# Patient Record
Sex: MALE | Race: WHITE | Employment: FULL TIME | URBAN - METROPOLITAN AREA
[De-identification: names, ages, dates, MRNs, and addresses within clinical notes are randomized per-mention and may not be internally consistent; named-entity substitution may affect disease eponyms.]

---

## 2022-10-07 ENCOUNTER — OFFICE VISIT (OUTPATIENT)
Dept: ORTHOPEDIC SURGERY | Age: 47
End: 2022-10-07
Payer: COMMERCIAL

## 2022-10-07 DIAGNOSIS — M25.561 BILATERAL CHRONIC KNEE PAIN: Primary | ICD-10-CM

## 2022-10-07 DIAGNOSIS — M25.562 BILATERAL CHRONIC KNEE PAIN: Primary | ICD-10-CM

## 2022-10-07 DIAGNOSIS — G89.29 BILATERAL CHRONIC KNEE PAIN: Primary | ICD-10-CM

## 2022-10-07 NOTE — PROGRESS NOTES
Iveth Lu (: 1975) is a 50 y.o. male, patient, here for evaluation of the following chief complaint(s):  Knee Pain (right)       HPI:    The patient started having right knee pain after working on . He has associated swelling. The pain and swelling got better after 2 3 days and he is taking Duexis to help. The patient reports a soccer injury 10 to 20 years ago with 2 previous ACL surgeries. The describes pain is mild dull comes and goes. Is associated swelling. Exercise makes it worse ice makes it better along with Duexis. He was last seen in the emergency room for this issue. The patient's previous surgeries were performed by Dr. Tahira Schroeder and Tata Lopez. No Known Allergies    Current Outpatient Medications   Medication Sig    pravastatin (PRAVACHOL) 20 mg tablet Take 10 mg by mouth daily. No current facility-administered medications for this visit. History reviewed. No pertinent past medical history. History reviewed. No pertinent surgical history. History reviewed. No pertinent family history.      Social History     Socioeconomic History    Marital status:      Spouse name: Not on file    Number of children: Not on file    Years of education: Not on file    Highest education level: Not on file   Occupational History    Not on file   Tobacco Use    Smoking status: Unknown    Smokeless tobacco: Not on file   Substance and Sexual Activity    Alcohol use: Not on file    Drug use: Not on file    Sexual activity: Not on file   Other Topics Concern    Not on file   Social History Narrative    Not on file     Social Determinants of Health     Financial Resource Strain: Not on file   Food Insecurity: Not on file   Transportation Needs: Not on file   Physical Activity: Not on file   Stress: Not on file   Social Connections: Not on file   Intimate Partner Violence: Not on file   Housing Stability: Not on file       Review of Systems   All other systems reviewed and are negative. Vitals:  Ht 5' 8\" (1.727 m)   Wt 230 lb (104.3 kg)   BMI 34.97 kg/m²    Body mass index is 34.97 kg/m². Ortho Exam     The patient is well-developed and well-nourished. The patient presents today in alert and oriented x3 with a normal mood and affect. The patient stands with a normal weightbearing line walks with a slightly antalgic gait because of his bilateral knee pain. Right knee    Left knee     ASSESSMENT/PLAN:      1. Bilateral chronic knee pain     Below is the assessment and plan developed based on review of pertinent history, physical exam, labs, studies, and medications. No follow-ups on file. An electronic signature was used to authenticate this note.   -- Adeola Le MD

## 2022-10-10 VITALS — BODY MASS INDEX: 34.86 KG/M2 | HEIGHT: 68 IN | WEIGHT: 230 LBS

## 2022-10-10 RX ORDER — PRAVASTATIN SODIUM 20 MG/1
10 TABLET ORAL DAILY
COMMUNITY
Start: 2022-09-13

## 2023-05-24 RX ORDER — PRAVASTATIN SODIUM 20 MG
10 TABLET ORAL DAILY
COMMUNITY
Start: 2022-09-13

## 2024-10-18 ENCOUNTER — HOSPITAL ENCOUNTER (OUTPATIENT)
Facility: HOSPITAL | Age: 49
Discharge: HOME | End: 2024-10-21

## 2024-10-18 RX ORDER — PREDNISONE 20 MG/1
20 TABLET ORAL DAILY
COMMUNITY

## 2024-10-18 RX ORDER — METOPROLOL SUCCINATE 25 MG/1
25 TABLET, EXTENDED RELEASE ORAL DAILY
COMMUNITY

## 2024-10-18 NOTE — DISCHARGE INSTRUCTIONS
[facility]                   [address] [place] [address]   MAIN OR                                  [phone]   MAIN PRE OP           [phone]                                                                                AMBULATORY PRE OP          [phone]  PRE-ADMISSION TESTING    [phone]   Surgery Date:  10/23/2024           Is surgery arrival time given by surgeon?  NO  If “NO”, [facility] staff will call you between 3 and 7pm the day before your surgery with your arrival time. (If your surgery is on a Monday, we will call you the Friday before.)    Call [phone] after 7pm Monday-Friday if you did not receive this call.         INSTRUCTIONS BEFORE YOUR SURGERY   When You  Arrive Arrive at the 2nd Floor Admitting Desk on the day of your surgery.  Have your insurance card, photo ID,living will/advanced directive/POA (if applicable),  and any copayment (if needed)   Food   and   Drink NO food or drink after midnight the night before surgery    This means NO gum, mints, coffee, juice, food, etc.      You may drink WATER ONLY up until two (2) hours before your surgery. DO NOT ADD ANYTHING TO THIS WATER to avoid postponement of your surgery.     No alcohol (beer, wine, liquor) or marijuana 24 hours before and after surgery   Medications to   TAKE   Morning of Surgery MEDICATIONS TO TAKE THE MORNING OF SURGERY WITH A SIP OF WATER:   Metoprolol  Pravastatin      Medications  To  STOP      7 days before surgery Non-Steroidal anti-inflammatory Drugs (NSAID's): for example, Ibuprofen (Advil, Motrin), Naproxen (Aleve)  Aspirin, if taking for pain   Herbal supplements, vitamins, and fish oil     (Pain medications not listed above, including Tylenol may be taken)   Blood  Thinners If you take  Aspirin, Plavix, Coumadin, or any blood-thinning or anti-blood clot medicine, talk to the doctor who prescribed the medications for pre-operative instructions.  You will

## 2024-10-18 NOTE — PERIOP NOTE
Called surgeon office, spoke to Maggie about posting and orders not matching.  Per Maggie, orders are correct, hospital posting is not.  Maggie states she will call scheduling/Kerr to have posting corrected.  
from Pre Admission Testing with instructions for your blood thinner from your physician.    Bathing Clothing  Jewelry  Valuables     If you shower the morning of surgery, please do not apply anything to your skin (lotions, powders, deodorant, or makeup, especially mascara)  Do not shave or trim anywhere 24 hours before surgery  Wear your hair loose or down; no pony-tails, buns, or metal hair clips  Wear loose, comfortable, clean clothes  Wear glasses instead of contacts. Bring a case to keep your glasses safe.  Leave money, valuables, and jewelry, including body piercings, at home.     Going Home - or Spending the Night SAME-DAY SURGERY: You must have a responsible adult drive you home and stay with you 24 hours after surgery. You may not drive for 24 hours after surgery.     Special Instructions Free  parking available from 7 AM until 5 PM.       Follow all instructions so your surgery won’t be cancelled.  Please, be on time.                    If a situation occurs and you are delayed the day of surgery, call [phone].    If your physical condition changes (like a fever, cold, flu, etc.) call your surgeon.    Home medication(s) reviewed and verified via LIST and VERBALLY during PAT appointment.    The patient was contacted IN-PERSON.  The patient verbalizes understanding of all instructions and DOES NOT need reinforcement.

## 2024-10-23 ENCOUNTER — HOSPITAL ENCOUNTER (OUTPATIENT)
Facility: HOSPITAL | Age: 49
Setting detail: OUTPATIENT SURGERY
Discharge: HOME | End: 2024-10-23
Attending: OTOLARYNGOLOGY | Admitting: OTOLARYNGOLOGY
Payer: COMMERCIAL

## 2024-10-23 ENCOUNTER — ANESTHESIA (OUTPATIENT)
Facility: HOSPITAL | Age: 49
End: 2024-10-23
Payer: COMMERCIAL

## 2024-10-23 ENCOUNTER — ANESTHESIA EVENT (OUTPATIENT)
Facility: HOSPITAL | Age: 49
End: 2024-10-23
Payer: COMMERCIAL

## 2024-10-23 VITALS
RESPIRATION RATE: 14 BRPM | HEART RATE: 74 BPM | DIASTOLIC BLOOD PRESSURE: 94 MMHG | OXYGEN SATURATION: 91 % | BODY MASS INDEX: 35.16 KG/M2 | TEMPERATURE: 97.8 F | WEIGHT: 232 LBS | SYSTOLIC BLOOD PRESSURE: 139 MMHG | HEIGHT: 68 IN

## 2024-10-23 PROCEDURE — 2500000003 HC RX 250 WO HCPCS

## 2024-10-23 PROCEDURE — 7100000011 HC PHASE II RECOVERY - ADDTL 15 MIN: Performed by: OTOLARYNGOLOGY

## 2024-10-23 PROCEDURE — 2709999900 HC NON-CHARGEABLE SUPPLY: Performed by: OTOLARYNGOLOGY

## 2024-10-23 PROCEDURE — 6360000002 HC RX W HCPCS: Performed by: ANESTHESIOLOGY

## 2024-10-23 PROCEDURE — 87075 CULTR BACTERIA EXCEPT BLOOD: CPT

## 2024-10-23 PROCEDURE — 7100000001 HC PACU RECOVERY - ADDTL 15 MIN: Performed by: OTOLARYNGOLOGY

## 2024-10-23 PROCEDURE — 2720000010 HC SURG SUPPLY STERILE: Performed by: OTOLARYNGOLOGY

## 2024-10-23 PROCEDURE — 2580000003 HC RX 258

## 2024-10-23 PROCEDURE — 87076 CULTURE ANAEROBE IDENT EACH: CPT

## 2024-10-23 PROCEDURE — 6360000002 HC RX W HCPCS: Performed by: OTOLARYNGOLOGY

## 2024-10-23 PROCEDURE — 3600000013 HC SURGERY LEVEL 3 ADDTL 15MIN: Performed by: OTOLARYNGOLOGY

## 2024-10-23 PROCEDURE — 6370000000 HC RX 637 (ALT 250 FOR IP): Performed by: OTOLARYNGOLOGY

## 2024-10-23 PROCEDURE — 6370000000 HC RX 637 (ALT 250 FOR IP): Performed by: ANESTHESIOLOGY

## 2024-10-23 PROCEDURE — 2580000003 HC RX 258: Performed by: OTOLARYNGOLOGY

## 2024-10-23 PROCEDURE — 7100000000 HC PACU RECOVERY - FIRST 15 MIN: Performed by: OTOLARYNGOLOGY

## 2024-10-23 PROCEDURE — 87147 CULTURE TYPE IMMUNOLOGIC: CPT

## 2024-10-23 PROCEDURE — 87077 CULTURE AEROBIC IDENTIFY: CPT

## 2024-10-23 PROCEDURE — 6360000002 HC RX W HCPCS

## 2024-10-23 PROCEDURE — 3700000000 HC ANESTHESIA ATTENDED CARE: Performed by: OTOLARYNGOLOGY

## 2024-10-23 PROCEDURE — 3600000003 HC SURGERY LEVEL 3 BASE: Performed by: OTOLARYNGOLOGY

## 2024-10-23 PROCEDURE — 7100000010 HC PHASE II RECOVERY - FIRST 15 MIN: Performed by: OTOLARYNGOLOGY

## 2024-10-23 PROCEDURE — 3700000001 HC ADD 15 MINUTES (ANESTHESIA): Performed by: OTOLARYNGOLOGY

## 2024-10-23 PROCEDURE — 87102 FUNGUS ISOLATION CULTURE: CPT

## 2024-10-23 PROCEDURE — 87185 SC STD ENZYME DETCJ PER NZM: CPT

## 2024-10-23 PROCEDURE — A4217 STERILE WATER/SALINE, 500 ML: HCPCS | Performed by: OTOLARYNGOLOGY

## 2024-10-23 RX ORDER — PROPOFOL 10 MG/ML
INJECTION, EMULSION INTRAVENOUS
Status: DISCONTINUED | OUTPATIENT
Start: 2024-10-23 | End: 2024-10-23 | Stop reason: SDUPTHER

## 2024-10-23 RX ORDER — ALBUTEROL SULFATE 0.83 MG/ML
2.5 SOLUTION RESPIRATORY (INHALATION)
Status: DISCONTINUED | OUTPATIENT
Start: 2024-10-23 | End: 2024-10-23 | Stop reason: HOSPADM

## 2024-10-23 RX ORDER — SODIUM CHLORIDE 9 MG/ML
INJECTION, SOLUTION INTRAVENOUS CONTINUOUS
Status: DISCONTINUED | OUTPATIENT
Start: 2024-10-23 | End: 2024-10-23 | Stop reason: HOSPADM

## 2024-10-23 RX ORDER — ROCURONIUM BROMIDE 10 MG/ML
INJECTION, SOLUTION INTRAVENOUS
Status: DISCONTINUED | OUTPATIENT
Start: 2024-10-23 | End: 2024-10-23 | Stop reason: SDUPTHER

## 2024-10-23 RX ORDER — VANCOMYCIN HYDROCHLORIDE 1 G/20ML
INJECTION, POWDER, LYOPHILIZED, FOR SOLUTION INTRAVENOUS PRN
Status: DISCONTINUED | OUTPATIENT
Start: 2024-10-23 | End: 2024-10-23 | Stop reason: ALTCHOICE

## 2024-10-23 RX ORDER — NALOXONE HYDROCHLORIDE 0.4 MG/ML
INJECTION, SOLUTION INTRAMUSCULAR; INTRAVENOUS; SUBCUTANEOUS PRN
Status: DISCONTINUED | OUTPATIENT
Start: 2024-10-23 | End: 2024-10-23 | Stop reason: HOSPADM

## 2024-10-23 RX ORDER — ACETAMINOPHEN 325 MG/1
650 TABLET ORAL ONCE
Status: COMPLETED | OUTPATIENT
Start: 2024-10-23 | End: 2024-10-23

## 2024-10-23 RX ORDER — OXYMETAZOLINE HYDROCHLORIDE 0.05 G/100ML
2 SPRAY NASAL 2 TIMES DAILY PRN
Status: DISCONTINUED | OUTPATIENT
Start: 2024-10-23 | End: 2024-10-23 | Stop reason: HOSPADM

## 2024-10-23 RX ORDER — DROPERIDOL 2.5 MG/ML
0.62 INJECTION, SOLUTION INTRAMUSCULAR; INTRAVENOUS
Status: DISCONTINUED | OUTPATIENT
Start: 2024-10-23 | End: 2024-10-23 | Stop reason: HOSPADM

## 2024-10-23 RX ORDER — FAMOTIDINE 10 MG/ML
INJECTION, SOLUTION INTRAVENOUS
Status: DISCONTINUED | OUTPATIENT
Start: 2024-10-23 | End: 2024-10-23 | Stop reason: SDUPTHER

## 2024-10-23 RX ORDER — FENTANYL CITRATE 50 UG/ML
INJECTION, SOLUTION INTRAMUSCULAR; INTRAVENOUS
Status: DISCONTINUED | OUTPATIENT
Start: 2024-10-23 | End: 2024-10-23 | Stop reason: SDUPTHER

## 2024-10-23 RX ORDER — ONDANSETRON 2 MG/ML
INJECTION INTRAMUSCULAR; INTRAVENOUS
Status: DISCONTINUED | OUTPATIENT
Start: 2024-10-23 | End: 2024-10-23 | Stop reason: SDUPTHER

## 2024-10-23 RX ORDER — OXYCODONE HYDROCHLORIDE 5 MG/1
5 TABLET ORAL
Status: DISCONTINUED | OUTPATIENT
Start: 2024-10-23 | End: 2024-10-23 | Stop reason: HOSPADM

## 2024-10-23 RX ORDER — SODIUM CHLORIDE 9 MG/ML
INJECTION, SOLUTION INTRAVENOUS
Status: DISCONTINUED | OUTPATIENT
Start: 2024-10-23 | End: 2024-10-23 | Stop reason: SDUPTHER

## 2024-10-23 RX ORDER — MEPERIDINE HYDROCHLORIDE 25 MG/ML
12.5 INJECTION INTRAMUSCULAR; INTRAVENOUS; SUBCUTANEOUS EVERY 5 MIN PRN
Status: DISCONTINUED | OUTPATIENT
Start: 2024-10-23 | End: 2024-10-23 | Stop reason: HOSPADM

## 2024-10-23 RX ORDER — DIPHENHYDRAMINE HYDROCHLORIDE 50 MG/ML
12.5 INJECTION INTRAMUSCULAR; INTRAVENOUS
Status: DISCONTINUED | OUTPATIENT
Start: 2024-10-23 | End: 2024-10-23 | Stop reason: HOSPADM

## 2024-10-23 RX ORDER — IPRATROPIUM BROMIDE AND ALBUTEROL SULFATE 2.5; .5 MG/3ML; MG/3ML
1 SOLUTION RESPIRATORY (INHALATION)
Status: DISCONTINUED | OUTPATIENT
Start: 2024-10-23 | End: 2024-10-23 | Stop reason: HOSPADM

## 2024-10-23 RX ORDER — DEXMEDETOMIDINE HYDROCHLORIDE 100 UG/ML
INJECTION, SOLUTION INTRAVENOUS
Status: DISCONTINUED | OUTPATIENT
Start: 2024-10-23 | End: 2024-10-23 | Stop reason: SDUPTHER

## 2024-10-23 RX ORDER — MIDAZOLAM HYDROCHLORIDE 1 MG/ML
INJECTION, SOLUTION INTRAMUSCULAR; INTRAVENOUS
Status: DISCONTINUED | OUTPATIENT
Start: 2024-10-23 | End: 2024-10-23 | Stop reason: SDUPTHER

## 2024-10-23 RX ORDER — EPINEPHRINE 1 MG/ML(1)
AMPUL (ML) INJECTION PRN
Status: DISCONTINUED | OUTPATIENT
Start: 2024-10-23 | End: 2024-10-23 | Stop reason: ALTCHOICE

## 2024-10-23 RX ORDER — LIDOCAINE HYDROCHLORIDE 10 MG/ML
1 INJECTION, SOLUTION EPIDURAL; INFILTRATION; INTRACAUDAL; PERINEURAL
Status: DISCONTINUED | OUTPATIENT
Start: 2024-10-23 | End: 2024-10-23 | Stop reason: HOSPADM

## 2024-10-23 RX ORDER — LABETALOL HYDROCHLORIDE 5 MG/ML
10 INJECTION, SOLUTION INTRAVENOUS
Status: DISCONTINUED | OUTPATIENT
Start: 2024-10-23 | End: 2024-10-23 | Stop reason: HOSPADM

## 2024-10-23 RX ORDER — DEXAMETHASONE SODIUM PHOSPHATE 4 MG/ML
INJECTION, SOLUTION INTRA-ARTICULAR; INTRALESIONAL; INTRAMUSCULAR; INTRAVENOUS; SOFT TISSUE
Status: DISCONTINUED | OUTPATIENT
Start: 2024-10-23 | End: 2024-10-23 | Stop reason: SDUPTHER

## 2024-10-23 RX ORDER — HYDROMORPHONE HYDROCHLORIDE 1 MG/ML
1 INJECTION, SOLUTION INTRAMUSCULAR; INTRAVENOUS; SUBCUTANEOUS EVERY 5 MIN PRN
Status: DISCONTINUED | OUTPATIENT
Start: 2024-10-23 | End: 2024-10-23 | Stop reason: HOSPADM

## 2024-10-23 RX ORDER — DEXAMETHASONE SODIUM PHOSPHATE 10 MG/ML
10 INJECTION, SOLUTION INTRAMUSCULAR; INTRAVENOUS
Status: DISCONTINUED | OUTPATIENT
Start: 2024-10-23 | End: 2024-10-23 | Stop reason: HOSPADM

## 2024-10-23 RX ADMIN — FAMOTIDINE 20 MG: 10 INJECTION, SOLUTION INTRAVENOUS at 15:14

## 2024-10-23 RX ADMIN — DEXAMETHASONE SODIUM PHOSPHATE 10 MG: 4 INJECTION, SOLUTION INTRAMUSCULAR; INTRAVENOUS at 15:10

## 2024-10-23 RX ADMIN — PROPOFOL 150 MCG/KG/MIN: 10 INJECTION, EMULSION INTRAVENOUS at 15:02

## 2024-10-23 RX ADMIN — PROPOFOL 50 MG: 10 INJECTION, EMULSION INTRAVENOUS at 15:37

## 2024-10-23 RX ADMIN — FENTANYL CITRATE 50 MCG: 50 INJECTION, SOLUTION INTRAMUSCULAR; INTRAVENOUS at 15:11

## 2024-10-23 RX ADMIN — ONDANSETRON 4 MG: 2 INJECTION INTRAMUSCULAR; INTRAVENOUS at 15:14

## 2024-10-23 RX ADMIN — OXYMETAZOLINE HYDROCHLORIDE 2 SPRAY: 0.5 SPRAY NASAL at 14:32

## 2024-10-23 RX ADMIN — FENTANYL CITRATE 50 MCG: 50 INJECTION, SOLUTION INTRAMUSCULAR; INTRAVENOUS at 14:56

## 2024-10-23 RX ADMIN — FENTANYL CITRATE 50 MCG: 50 INJECTION, SOLUTION INTRAMUSCULAR; INTRAVENOUS at 16:23

## 2024-10-23 RX ADMIN — SODIUM CHLORIDE: 9 INJECTION, SOLUTION INTRAVENOUS at 14:55

## 2024-10-23 RX ADMIN — WATER 2000 MG: 1 INJECTION INTRAMUSCULAR; INTRAVENOUS; SUBCUTANEOUS at 15:11

## 2024-10-23 RX ADMIN — OXYMETAZOLINE HYDROCHLORIDE 2 SPRAY: 0.5 SPRAY NASAL at 14:55

## 2024-10-23 RX ADMIN — PROPOFOL 200 MG: 10 INJECTION, EMULSION INTRAVENOUS at 15:00

## 2024-10-23 RX ADMIN — ROCURONIUM BROMIDE 20 MG: 10 INJECTION, SOLUTION INTRAVENOUS at 15:37

## 2024-10-23 RX ADMIN — FENTANYL CITRATE 50 MCG: 50 INJECTION, SOLUTION INTRAMUSCULAR; INTRAVENOUS at 15:00

## 2024-10-23 RX ADMIN — MIDAZOLAM HYDROCHLORIDE 2 MG: 1 INJECTION, SOLUTION INTRAMUSCULAR; INTRAVENOUS at 14:55

## 2024-10-23 RX ADMIN — DEXMEDETOMIDINE 12 MCG: 100 INJECTION, SOLUTION INTRAVENOUS at 15:34

## 2024-10-23 RX ADMIN — LIDOCAINE HYDROCHLORIDE 100 MG: 20 INJECTION, SOLUTION EPIDURAL; INFILTRATION; INTRACAUDAL; PERINEURAL at 15:00

## 2024-10-23 RX ADMIN — ACETAMINOPHEN 650 MG: 325 TABLET ORAL at 13:46

## 2024-10-23 RX ADMIN — ROCURONIUM BROMIDE 50 MG: 10 INJECTION, SOLUTION INTRAVENOUS at 15:00

## 2024-10-23 RX ADMIN — HYDROMORPHONE HYDROCHLORIDE 0.5 MG: 1 INJECTION, SOLUTION INTRAMUSCULAR; INTRAVENOUS; SUBCUTANEOUS at 16:55

## 2024-10-23 ASSESSMENT — PAIN DESCRIPTION - LOCATION: LOCATION: HEAD

## 2024-10-23 ASSESSMENT — PAIN - FUNCTIONAL ASSESSMENT
PAIN_FUNCTIONAL_ASSESSMENT: ACTIVITIES ARE NOT PREVENTED
PAIN_FUNCTIONAL_ASSESSMENT: 0-10

## 2024-10-23 ASSESSMENT — PAIN SCALES - GENERAL
PAINLEVEL_OUTOF10: 7
PAINLEVEL_OUTOF10: 2

## 2024-10-23 ASSESSMENT — PAIN DESCRIPTION - DESCRIPTORS: DESCRIPTORS: ACHING

## 2024-10-23 NOTE — OP NOTE
[facility]          [address] [place]  [address]                            OPERATIVE REPORT      PATIENT NAME: STEFFI ARENAS       : 1975  MED REC NO: [id number]                       ROOM: OR  ACCOUNT NO: [id number]                       ADMIT DATE: 10/23/2024  PROVIDER: Umang Christina MD    DATE OF SERVICE:  10/23/2024    PREOPERATIVE DIAGNOSES:  Right maxillary sinusitis.    POSTOPERATIVE DIAGNOSES:  Right maxillary sinusitis.    PROCEDURES PERFORMED:  Endoscopic right maxillary antrostomy with removal of tissue.    SURGEON:  Umang Christina MD    ASSISTANT:  ***    ANESTHESIA:  General.    ESTIMATED BLOOD LOSS:  Minimal.    SPECIMENS REMOVED:  None.    INTRAOPERATIVE FINDINGS:  There was edema in the middle meatus around the uncinate process region after entry into the sinus.  The sinus mucosa was very edematous and inflamed.  There was some purulent material in the sinus.     COMPLICATIONS:  None.    IMPLANTS:  ***    INDICATIONS:  The patient is a 49-year-old male who underwent a dental procedure and then developed some maxillary sinusitis on the right side that was persistent despite antibiotics.  CT scan showed opacification of the maxillary sinus on the right side.    DESCRIPTION OF PROCEDURE:  After informed consent, the patient was taken to the operating room and placed on the table in supine position.  After general anesthesia, the table was turned 180 degrees.  The nose was packed with epinephrine sponges and then the patient was prepped and draped in standard fashion.  Initially, the sponges were removed.  An 0 degree 4 mm endoscope was used to visualize the inside of the nose.  The middle turbinate was gently medialized.  The uncinate process was identified and was in-fractured with the ball-tip probe.  The uncinate was then removed using a 4 mm suction debrider device.  The natural os of the maxillary sinus was quite

## 2024-10-23 NOTE — ANESTHESIA PRE PROCEDURE
Past Surgical History:        Procedure Laterality Date    ANTERIOR CRUCIATE LIGAMENT REPAIR Bilateral     COLONOSCOPY      MOUTH SURGERY  09/2024    TONSILLECTOMY      as child       Social History:    Social History     Tobacco Use    Smoking status: Never    Smokeless tobacco: Never   Substance Use Topics    Alcohol use: Yes     Alcohol/week: 1.0 standard drink of alcohol     Types: 1 Cans of beer per week                                Counseling given: Not Answered      Vital Signs (Current):   Vitals:    10/23/24 1348   BP: (!) 140/90   Pulse: 88   Resp: 16   Temp: 97.9 °F (36.6 °C)   TempSrc: Oral   SpO2: 99%                                              BP Readings from Last 3 Encounters:   10/23/24 (!) 140/90       NPO Status: Time of last liquid consumption: 1330                        Time of last solid consumption: 1330                        Date of last liquid consumption: 10/22/24                        Date of last solid food consumption: 10/22/24    BMI:   Wt Readings from Last 3 Encounters:   10/10/22 104.3 kg (230 lb)     There is no height or weight on file to calculate BMI.    CBC: No results found for: \"WBC\", \"RBC\", \"HGB\", \"HCT\", \"MCV\", \"RDW\", \"PLT\"    CMP: No results found for: \"NA\", \"K\", \"CL\", \"CO2\", \"BUN\", \"CREATININE\", \"GFRAA\", \"AGRATIO\", \"LABGLOM\", \"GLUCOSE\", \"GLU\", \"CALCIUM\", \"BILITOT\", \"ALKPHOS\", \"AST\", \"ALT\"    POC Tests: No results for input(s): \"POCGLU\", \"POCNA\", \"POCK\", \"POCCL\", \"POCBUN\", \"POCHEMO\", \"POCHCT\" in the last 72 hours.    Coags: No results found for: \"PROTIME\", \"INR\", \"APTT\"    HCG (If Applicable): No results found for: \"PREGTESTUR\", \"PREGSERUM\", \"HCG\", \"HCGQUANT\"     ABGs: No results found for: \"PHART\", \"PO2ART\", \"ZWV5SSO\", \"WEI0UPN\", \"BEART\", \"K2WYHLPF\"     Type & Screen (If Applicable):  No results found for: \"ABORH\", \"LABANTI\"    Drug/Infectious Status (If Applicable):  No results found for: \"HIV\", \"HEPCAB\"    COVID-19 Screening (If Applicable): No results found

## 2024-10-23 NOTE — H&P
patient is able to communicate with normal voice quality.  HEAD AND NECK EXAM:    HEAD & FACE: No head or facial abnormalities present. No masses or lesions present.  Overall appearance is normal. Facial strength is normal.  EYES: Conjunctiva normal.  No abnormalities of the lids or globes. Extraocular movements intact and conjugate.  EARS: No significant abnormality of the external ear.   NOSE: No significant external nasal lesions. No turbinate hypertrophy or mucosal lesions.  No polyps, masses or purulence seen anteriorly.  No edema. No significant septal deviation.  SINUSES: The paranasal sinus regions are non-tender to palpation.  ORAL CAVITY/OROPHARYNX: Lips and gums are without lesions. Oral cavity and oropharynx are without mucosal lesions or abnormalities.  Tonsillar fossae, palate, tongue and uvula without abnormality. No edema.  No erythema.  NECK: No palpable lymphadenopathy or other masses in the neck. The trachea and larynx are midline. No thyroid enlargement or nodules appreciated. No abnormality of the parotid or submandibular glands present.  LYMPHATIC: No lymphadenopathy in the neck/head.  CHEST:  CTA  HEART:  RRR  NEUROLOGIC/PSYCHIATRIC:    NEUROLOGIC:  Cranial nerves 3, 4, 5, 6, 7, 10 (soft palate elevation), 11 and 12 bilaterally intact and symmetric.  ORIENTATION/MOOD/AFFECT: Oriented to person, place, time and general circumstances. Mood and affect appropriate.      Assessment:     Persistent right maxillary sinusitis    Plan:     Ready to proceed with endoscopic antrostomy with irrigation/cultures.  Questions answered. Consent signed.      Signed By: Umang Christina MD     October 23, 2024       Umang Christina MD, Located within [facility], [facility]  [email or web address]  [address] [place] [address], [address]  [place] [address]  Ph:  [phone]  Fax: [phone]

## 2024-10-23 NOTE — ANESTHESIA POSTPROCEDURE EVALUATION
Department of Anesthesiology  Postprocedure Note    Patient: Osmar Amos  MRN: [id number]  YOB: 1975  Date of evaluation: 10/23/2024    Procedure Summary       Date: 10/23/24 Room / Location: [facility] MAIN OR  / [facility] MAIN OR    Anesthesia Start: 1455 Anesthesia Stop: 1626    Procedure: ENDOSCOPIC MAXILLARY SINUSOTOMY AND WASHOUT (Nose) Diagnosis:       Chronic rhinitis      Maxillary sinusitis, unspecified chronicity      (Chronic rhinitis [J31.0])      (Maxillary sinusitis, unspecified chronicity [J32.0])    Surgeons: Umang Christina MD Responsible Provider: Alexa Grier MD    Anesthesia Type: General ASA Status: 2            Anesthesia Type: General    Gwen Phase I: Gwen Score: 9    Gwen Phase II:      Anesthesia Post Evaluation    Patient location during evaluation: PACU  Patient participation: complete - patient participated  Level of consciousness: awake and sleepy but conscious  Airway patency: patent  Nausea & Vomiting: no vomiting and no nausea  Cardiovascular status: hemodynamically stable  Respiratory status: acceptable  Hydration status: stable  Comments: Patient seen and examined.  Ready for discharge from PACU.  Pain management: satisfactory to patient    No notable events documented.

## 2024-10-23 NOTE — DISCHARGE INSTRUCTIONS
Patient Discharge Instructions    Osmar Amos / [id number] : 1975    Admitted 10/23/2024 Discharged: 10/23/2024     Postoperative Instructions for Sinus Surgery    The purpose of Functional Endoscopic Sinus Surgery (FESS) is to enlarge the natural sinus drainage pathways to prevent obstruction and infection.  The surgery is performed through the nostrils using endoscopes and special instruments.  Removal of bone and tissue blocking the sinus drainage pathways leaves, by nature, raw surfaces in the nose that must heal by lining tissue, or mucosa, growing back to re-line the newly created space.  The #1 impediment to healing (other than smoking) is dryness.  When blood clots and mucous dry on the surface of the operative sites, hard crusts can form that prevent the mucosa from healing over the raw areas.  Crusts also act as scaffolding for scar bands to grow across opened drainage pathways that may affect the success of the surgery.  These scar bands sometimes require further surgery to correct.      There are a few things that can be done to prevent crusting.  At the time of surgery, new viscous gel type material can be placed over the raw surfaces to keep the areas moist for a few days.  This material dissolves slowly over the first few days after surgery.  After surgery, saline irrigation of the nose is extremely important to prevent dryness.  Your participation in the postoperative care can sometimes “make or break” the success of the surgery.      SWELLING    Every operation, no matter how minor, is accompanied by swelling of the surrounding tissues.  The degree of swelling varies from person to person, and with the amount of surgery required.  There is usually no very much, if any, external swelling after FESS.  However, if other procedures, such as septoplasty or rhinoplasty were performed along with the FESS, swelling may be a bigger factor.    Staying upright as much as possible is

## 2024-10-23 NOTE — BRIEF OP NOTE
Brief Postoperative Note      Patient: Osmar Amos  YOB: 1975  MRN: [id number]    Date of Procedure: 10/23/2024    Pre-Op Diagnosis Codes:      * Chronic rhinitis [J31.0]     * Maxillary sinusitis, unspecified chronicity [J32.0]    Post-Op Diagnosis: Same       Procedure(s):  ENDOSCOPIC MAXILLARY SINUSOTOMY AND WASHOUT    Surgeon(s):  Umang Christina MD    Assistant:  * No surgical staff found *    Anesthesia: General    Estimated Blood Loss (mL): Minimal    Complications: None    Specimens:   ID Type Source Tests Collected by Time Destination   A : right maxillary sinus/culture Swab Nose CULTURE, ANAEROBIC, CULTURE, FUNGUS Umang Christina MD 10/23/2024 1535        Implants:  * No implants in log *      Drains: * No LDAs found *    Findings:  Infection Present At Time Of Surgery (PATOS) (choose all levels that have infection present):  - Superficial Infection (skin/subcutaneous) present as evidenced by pus  (noted inside right maxillary sinus)  Other Findings: edematous/inflamed right maxillary sinus mucosa    Electronically signed by Umang Christina MD on 10/23/2024 at 4:11 PM

## 2024-10-25 LAB
BACTERIA SPEC CULT: ABNORMAL
SERVICE CMNT-IMP: ABNORMAL

## 2024-10-28 LAB
BACTERIA SPEC CULT: NORMAL
SERVICE CMNT-IMP: NORMAL

## 2024-11-04 LAB
BACTERIA SPEC CULT: NORMAL
SERVICE CMNT-IMP: NORMAL

## 2024-11-11 LAB
BACTERIA SPEC CULT: NORMAL
SERVICE CMNT-IMP: NORMAL

## 2024-11-18 LAB
BACTERIA SPEC CULT: NORMAL
SERVICE CMNT-IMP: NORMAL

## 2024-11-25 LAB
BACTERIA SPEC CULT: NORMAL
SERVICE CMNT-IMP: NORMAL

## (undated) DEVICE — APPLICATOR  COTTON-TIPPED 6 IN WOOD STRL

## (undated) DEVICE — PACKING 440406 10PK POPE EPISTAXIS: Brand: MEROCEL®

## (undated) DEVICE — SOLUTION IRRIG 1000ML STRL H2O USP PLAS POUR BTL

## (undated) DEVICE — AGENT HEMSTAT 3GM OXIDIZED REGENERATED CELOS ABSRB FOR CONT (ORDER MULTIPLES OF 5EA)

## (undated) DEVICE — BLADE 1884004 TRICUT 5PK 4MM: Brand: TRICUT®

## (undated) DEVICE — MINOR ENT-SFMCASU: Brand: MEDLINE INDUSTRIES, INC.

## (undated) DEVICE — SOLUTION IRRIG 1000ML 0.9% SOD CHL USP POUR PLAS BTL

## (undated) DEVICE — COAGULATOR SUCT 8FR L6IN HNDL FOR ENT PROC

## (undated) DEVICE — SPONGE GZ W4XL4IN COT 12 PLY TYP VII WVN C FLD DSGN STERILE

## (undated) DEVICE — KIT,ANTI FOG,W/SPONGE & FLUID,SOFT PACK: Brand: MEDLINE

## (undated) DEVICE — BLADE 1884006HR RAD40 5PK M4 4MM ROTATE: Brand: RAD

## (undated) DEVICE — GLOVE ORANGE PI 7 1/2   MSG9075

## (undated) DEVICE — GLOVE ORANGE PI 7   MSG9070